# Patient Record
Sex: FEMALE | Race: WHITE
[De-identification: names, ages, dates, MRNs, and addresses within clinical notes are randomized per-mention and may not be internally consistent; named-entity substitution may affect disease eponyms.]

---

## 2018-05-15 ENCOUNTER — HOSPITAL ENCOUNTER (OUTPATIENT)
Dept: HOSPITAL 47 - RADUSWWP | Age: 63
Discharge: HOME | End: 2018-05-15
Payer: COMMERCIAL

## 2018-05-15 DIAGNOSIS — I65.23: ICD-10-CM

## 2018-05-15 DIAGNOSIS — K76.89: Primary | ICD-10-CM

## 2018-05-15 PROCEDURE — 93880 EXTRACRANIAL BILAT STUDY: CPT

## 2018-05-15 PROCEDURE — 76705 ECHO EXAM OF ABDOMEN: CPT

## 2018-05-15 NOTE — US
EXAMINATION TYPE: US carotid duplex BILAT

 

DATE OF EXAM: 5/15/2018

 

COMPARISON: NONE

 

CLINICAL HISTORY: I25.10 atherosclerotic heart disease of native.

 

EXAM MEASUREMENTS: 

 

RIGHT:  Peak Systolic Velocity (PSV) cm/sec

----- Right CCA:  62.3  

----- Right ICA:  87.7     

----- Right ECA:  76.7   

ICA/CCA ratio:  1.4    

 

RIGHT:  End Diastole cm/sec

----- Right CCA:  22.7   

----- Right ICA:  35.9      

----- Right ECA:  18.3     

 

LEFT:  Peak Systolic Velocity (PSV) cm/sec

----- Left CCA:  72.2  

----- Left ICA:  61.6   

----- Left ECA:  104.3  

ICA/CCA ratio:  0.9  

 

LEFT:  End Diastole cm/sec

----- Left CCA:  28.0  

----- Left ICA:  27.6   

----- Left ECA:  26.0 

 

VERTEBRALS (direction of flow):

Right Vertebral: Antegrade

Left Vertebral: Antegrade

 

Rhythm:  Normal

 

Mild intimal wall changes noted at bilateral carotid bifurcation and PSV is wnl bilaterally.

 

Incidental finding: bilateral thyroid nodules are seen. On the left there is a cystic appearing nodul
e measuring 1.4 x 1.8 x 1.1 cm and on the right there is a hypoechoic nodule measuring 0.7 x 0.4 x 0.
6 cm.

 

IMPRESSION: 

1. Mild grayscale atheromatous plaquing without hemodynamically significant stenosis of either caroti
d or serial system.

2. Incidental note of bilateral thyroid nodules. Full evaluation with thyroid ultrasound could be per
formed.

## 2018-05-15 NOTE — US
EXAMINATION TYPE: US abdomen limited

 

DATE OF EXAM: 5/15/2018

 

COMPARISON: NONE

 

CLINICAL HISTORY: I25.10 atherosclerotic heart disease of native.

 

EXAM MEASUREMENTS:

 

Liver Length:  17.3 cm   

Gallbladder Wall:  0.2 cm   

CBD:  0.4 cm

Right Kidney:  9.9 x 4.7 x 4.2 cm

 

 

 

Pancreas:  hyperechoic

Liver:  hyperechoic suggests fatty liver . This limits evaluation for underlying hepatic masses. 

Gallbladder:  wnl

**Evidence for sonographic Landis's sign:  No

CBD:  wnl 

Right Kidney:  No hydronephrosis or masses seen 

 

 

 

IMPRESSION: 

1. Hyperechoic hepatic echotexture most commonly relating to underlying hepatic steatosis. This appea
rs moderate in degree.

2. No sonographic evidence of cholelithiasis or acute cholecystitis.

## 2018-05-25 ENCOUNTER — HOSPITAL ENCOUNTER (OUTPATIENT)
Dept: HOSPITAL 47 - RADUSWWP | Age: 63
Discharge: HOME | End: 2018-05-25
Payer: COMMERCIAL

## 2018-05-25 DIAGNOSIS — E04.2: Primary | ICD-10-CM

## 2018-05-25 PROCEDURE — 76536 US EXAM OF HEAD AND NECK: CPT

## 2018-05-25 NOTE — US
EXAMINATION TYPE: US thyroid st tissue head/neck

 

DATE OF EXAM: 5/25/2018

 

COMPARISON: NONE

 

CLINICAL HISTORY: E04.1 SINGLE THYROID NODULE. Nodules visualized on carotid ultrasound 

 

GLAND SIZE:

 

Right Lobe: 4.1 x 1.0 x 1.4 cm

** Overall Parenchyma:  homogenous

Left Lobe: 4.5 x 1.1 x 1.5 cm

** Overall Parenchyma:  homogeneous

Isthmus Thickness:  0.3 cm

 

NODULES

 

RIGHT:   # of nodules measured on right:  3  

1.  0.7 X 0.4  x 0.6 cm isoechoic solid nodule at the lower pole with well-defined margins; .  This n
odule is wider than tall and shows intranodular vascularity.

** Prior size: No previous 

2. 0.4 X 0.3  x 0.4 cm hypoechoic mixed cystic nodule at the mid pole with well-defined margins; .  T
his nodule is wider than tall and shows intranodular vascularity.

 ** Prior size: No previous

3. 0.4 X 0.2  x 0.4 cm hypoechoic solid nodule at the lower pole with well-defined margins; .  This n
odule is wider than tall and shows intranodular vascularity.

** Prior size: No previous  

 

LEFT:    # of nodules measured on left:  2

1.  1.1 X 1.1  x 1.2 cm hypoechoic cystic nodule at the mid pole with well-defined margins; .  This n
odule is taller than wide and shows no intranodular vascularity.

** Prior size: No previous  

2. 0.5 X 0.3  x 0.5 cm hypoechoic solid nodule at the upper pole with well-defined margins; .  This n
odule is wider than tall and shows no intranodular vascularity.

 ** Prior size:  No previous 

 

ISTHMUS:    # of nodules measured in the isthmus:  0

 

 

Bilateral thyroid nodules. Bilateral neck scanned, no evidence of lymphadenopathy.

 

 

 

 

 

IMPRESSION:

No dominant thyroid mass. Multiple bilateral nodules. Largest abnormality seen is purely cystic nodul
e in the left thyroid lobe.

## 2018-08-13 ENCOUNTER — HOSPITAL ENCOUNTER (OUTPATIENT)
Dept: HOSPITAL 47 - RADUSWWP | Age: 63
Discharge: HOME | End: 2018-08-13
Attending: FAMILY MEDICINE
Payer: COMMERCIAL

## 2018-08-13 DIAGNOSIS — E04.1: Primary | ICD-10-CM

## 2018-08-13 PROCEDURE — 76536 US EXAM OF HEAD AND NECK: CPT

## 2018-08-13 NOTE — US
EXAMINATION TYPE: US thyroid st tissue head/neck

 

DATE OF EXAM: 8/13/2018

 

COMPARISON: US 5/25/2018

 

CLINICAL HISTORY: E04.1 thyroid nodule.

 

GLAND SIZE:

 

Right Lobe: 3.9 x 1.2 x 1.2 cm

** Overall Parenchyma:  homogenous

Left Lobe: 4.6 x 1.4 x 1.4 cm

** Overall Parenchyma:  homogeneous

Isthmus Thickness:  0.3 cm

 

NODULES

 

RIGHT:   # of nodules measured on right: 3

1.  0.7  X 0.4  x 0.6 cm isoechoic solid nodule at the mid pole with well-defined margins; .  This no
dule is wider than tall and shows intranodular vascularity.

** Prior size: 0.7 x 0.4  x 0.6 cm 

2. 0.4 X 0.3  x 0.4 cm hypoechoic mixed nodule at the mid pole with well-defined margins; .  This nod
ule is wider than tall and shows no intranodular vascularity.

 ** Prior size:  0.4 x 0.3  x 0.4 cm 

3. 0.4 X 0.3  x 0.4 cm hypoechoic solid nodule at the lower pole with well-defined margins; .  This n
odule is wider than tall and shows intranodular vascularity.

** Prior size:  0.4 x 0.2  x 0.4 cm 

 

LEFT:    # of nodules measured on left:  2

1.  1.5 X 0.9  x 1.0 cm hypoechoic cystic nodule at the mid pole with well-defined margins; .  This n
odule is wider than tall and shows no intranodular vascularity.

** Prior size: 1.1 x 1.1  x 1.2 cm 

2. 0.5 X 0.3  x 0.5 cm hypoechoic solid nodule at the upper pole with well-defined margins; .  This n
odule is wider than tall and shows no intranodular vascularity.

 ** Prior size:  0.5 x 0.3  x 0.5 cm 

 

 

ISTHMUS:    # of nodules measured in the isthmus:  0

 

 

Bilateral neck scanned, no evidence of lymphadenopathy.

 

 

 

 

 

IMPRESSION:

Multiple findings as above. No dominant thyroid mass. This is consistent with benign disease. No adve
rse change compared to old exam.

## 2018-10-30 ENCOUNTER — HOSPITAL ENCOUNTER (OUTPATIENT)
Dept: HOSPITAL 47 - RADCTMAIN | Age: 63
End: 2018-10-30
Attending: UROLOGY
Payer: COMMERCIAL

## 2018-10-30 DIAGNOSIS — R93.49: Primary | ICD-10-CM

## 2018-10-30 PROCEDURE — 74176 CT ABD & PELVIS W/O CONTRAST: CPT

## 2018-10-30 NOTE — CT
EXAMINATION TYPE: CT abdomen pelvis wo con

 

DATE OF EXAM: 10/30/2018

 

COMPARISON: None

 

HISTORY: Right hydronephrosis on US.

 

CT DLP: 359.3 mGycm

 

Examination of the solid and hollow viscera is limited given the lack of contrast.

 

FINDINGS: 

 

LUNG BASES: No evidence for nodule. No evidence for infiltrate.

 

LIVER/GB: The gallbladder is unremarkable. No space-occupying hepatic lesion.

 

PANCREAS: No pancreatic mass identified. No inflammatory process seen.

 

SPLEEN: No evidence for splenomegaly. No intrasplenic lesions seen.

 

ADRENALS: No adrenal nodules identified. No evidence for thickening.

 

KIDNEYS: Right-sided extrarenal pelvis. No evidence for renal mass. No nephrolithiasis. No hydronephr
osis.

 

BOWEL: Appendix has a normal appearance. No evidence of bowel obstruction. No inflammatory process.

 

Lymph nodes: No evidence for adenopathy greater than 1 cm.

 

Abdominal aorta: Atheromatous changes seen. No evidence for aneurysm.

 

Genital organs: No significant abnormality.

 

Other: No significant abnormality.

 

IMPRESSION: 

1. Right-sided extrarenal pelvis without evidence for hydronephrosis.

## 2018-12-05 ENCOUNTER — HOSPITAL ENCOUNTER (OUTPATIENT)
Dept: HOSPITAL 47 - RADECHMAIN | Age: 63
Discharge: HOME | End: 2018-12-05
Attending: INTERNAL MEDICINE
Payer: COMMERCIAL

## 2018-12-05 DIAGNOSIS — I08.8: ICD-10-CM

## 2018-12-05 DIAGNOSIS — I08.3: Primary | ICD-10-CM

## 2018-12-05 PROCEDURE — 93306 TTE W/DOPPLER COMPLETE: CPT

## 2018-12-05 PROCEDURE — 93351 STRESS TTE COMPLETE: CPT

## 2018-12-05 NOTE — ECHOF
Referral Reason:R07.9 Chest pain



MEASUREMENTS

--------

HEIGHT: 139.7 cm

WEIGHT: 29.5 kg

BP: 

RVIDd:   2.5 cm     (< 3.3)

IVSd:   1.0 cm     (0.6 - 1.1)

LVIDd:   4.2 cm     (3.9 - 5.3)

LVPWd:   1.4 cm     (0.6 - 1.1)

IVSs:   1.5 cm

LVIDs:   2.4 cm

LVPWs:   1.8 cm

LA Diam:   4.4 cm     (2.7 - 3.8)

LAESV Index (A-L):   35.67 ml/m

Ao Diam:   2.8 cm     (2.0 - 3.7)

AV Cusp:   2.2 cm     (1.5 - 2.6)

MV EXCURSION:   18.221 mm     (> 18.000)

MV EF SLOPE:   47 mm/s     (70 - 150)

EPSS:   0.2 cm

MV E Sid:   0.85 m/s

MV DecT:   177 ms

MV A Sid:   0.89 m/s

MV E/A Ratio:   0.95 

AR PHT:   468 ms

RAP:   5.00 mmHg

RVSP:   23.58 mmHg







FINDINGS

--------

Sinus rhythm.

This was a technically good study.

The left ventricular size is normal.   Left ventricular wall thickness is normal.   Overall left vent
ricular systolic function is normal with, an EF between 55 - 60 %.

The right ventricle is normal in size.

LA is moderately dilated 34-39 ml/m2

The right atrial size is normal.

There is mild aortic valve sclerosis.   There is mild aortic regurgitation.

The mitral valve leaflets are mildly thickened.   Moderate mitral regurgitation is present.

Mild tricuspid regurgitation present.   There is no evidence of pulmonary hypertension.   The right v
entricular systolic pressure, as measured by Doppler, is 23.58mmHg.

Trace/mild (physiologic)  pulmonic regurgitation.

The aortic root size is normal.

There is no pericardial effusion.



CONCLUSIONS

--------

1. Sinus rhythm.

2. This was a technically good study.

3. The left ventricular size is normal.

4. Left ventricular wall thickness is normal.

5. Overall left ventricular systolic function is normal with, an EF between 55 - 60 %.

6. LA is moderately dilated 34-39 ml/m2

7. There is mild aortic valve sclerosis.

8. There is mild aortic regurgitation.

9. The mitral valve leaflets are mildly thickened.

10. Moderate mitral regurgitation is present.

11. Mild tricuspid regurgitation present.

12. There is no evidence of pulmonary hypertension.

13. Trace/mild (physiologic)  pulmonic regurgitation.

14. The aortic root size is normal.

15. There is no pericardial effusion.





SONOGRAPHER: Nathalie Hudson RDCS

## 2019-03-19 ENCOUNTER — HOSPITAL ENCOUNTER (OUTPATIENT)
Dept: HOSPITAL 47 - RADUSMAIN | Age: 64
Discharge: HOME | End: 2019-03-19
Attending: INTERNAL MEDICINE
Payer: COMMERCIAL

## 2019-03-19 DIAGNOSIS — I70.1: Primary | ICD-10-CM

## 2019-03-19 PROCEDURE — 93975 VASCULAR STUDY: CPT

## 2019-03-19 NOTE — US
EXAMINATION TYPE: US renal artery duplex complet

 

DATE OF EXAM: 3/19/2019

 

COMPARISON: NONE

 

CLINICAL HISTORY: Renal Artery Stenosis I70.1.

 

MEASUREMENTS:

 

RENAL SIZE:

Rt Kidney: 10.1 x 4.0 x 4.5cm

Lt Kidney: 8.8 x 4.7 x 4.4cm

 

RESISTANCE INDEX

Right:  0.69

Left: 0.67

 

RA/AO RATIO (< 3.5 )

Right: 1.5

Left: 1.6

 

RA VELOCITY ( < 180 cm/s)

Right: 109cm/s

Left: 120cm/s

 

Probable extra renal pelvis right.

 

Some technical difficulty due to overlying bowel gas. No evidence of renal artery stenosis. Left kidn
ey measures slightly atrophic.

 

 

IMPRESSION:

No sonographic evidence of renal arterial stenosis.

## 2019-06-20 ENCOUNTER — HOSPITAL ENCOUNTER (OUTPATIENT)
Dept: HOSPITAL 47 - RADUSWWP | Age: 64
Discharge: HOME | End: 2019-06-20
Attending: FAMILY MEDICINE
Payer: COMMERCIAL

## 2019-06-20 DIAGNOSIS — E04.2: Primary | ICD-10-CM

## 2019-06-20 PROCEDURE — 76536 US EXAM OF HEAD AND NECK: CPT

## 2019-06-21 NOTE — US
EXAMINATION TYPE: US thyroid st tissue head/neck

 

DATE OF EXAM: 6/20/2019

 

COMPARISON: 5/25/2018

 

CLINICAL HISTORY: E04.2 goiter. Multinodular goiter.

 

GLAND SIZE:

 

Right Lobe: 4.2 x 1.7 x 1.5 cm

** Overall Parenchyma:  homogenous

Left Lobe: 4.4 x 1.5 x 1.5 cm

** Overall Parenchyma:  homogeneous

Isthmus Thickness:  0.3 cm

 

NODULES

 

RIGHT:   # of nodules measured on right: 3

1.  .9 X .5  x .8 cm isoechoic solid nodule at the mid pole with well-defined margins.  This nodule i
s wider than tall and shows intranodular vascularity.

** Prior size: .7 x .4  x .6 cm 

2. .4 X .3  x .3 cm hypoechoic mixed nodule at the mid pole with well-defined margins.  This nodule i
s wider than tall and shows intranodular vascularity.

 ** Prior size:  .4 x .3  x .4 cm 

3. .7 X .5  x .6 cm hypoechoic mixed nodule at the lower pole with well-defined margins.  This nodule
 is wider than tall and shows intranodular vascularity.

** Prior size:  .4 x .3  x .4 cm 

 

LEFT:    # of nodules measured on left:  3

1.  .7 X .7  x .6 cm hypoechoic mixed nodule at the mid pole with well-defined margins.  This nodule 
is wider than tall and shows no intranodular vascularity.

** Prior size: 1.5 x .9  x 1.0 cm 

2. .7 X .7  x .6 cm hypoechoic solid nodule at the lower pole with well-defined margins.  This nodule
 is wider than tall and shows no intranodular vascularity.

 ** Prior size:  .5 x .3  x .5 cm 

3. .5 X .3  x .5 cm hypoechoic cystic nodule at the upper pole with well-defined margins.  This nodul
e is wider than tall and shows no intranodular vascularity.

 

ISTHMUS:    # of nodules measured in the isthmus:  0

 

Bilateral neck scanned. 

Hypoechoic area with the appearance of a lymph node right neck measuring 1.3 x 0.4 x 0.8, nonenlarged
.

 

 

 

IMPRESSION:

Overall similar size of the multiple bilateral subcentimeter thyroid nodules, few of which demonstrat
ing only minimal growth in comparison to the exam of 5/25/2018.

## 2020-01-07 ENCOUNTER — HOSPITAL ENCOUNTER (OUTPATIENT)
Dept: HOSPITAL 47 - RADECHMAIN | Age: 65
Discharge: HOME | End: 2020-01-07
Attending: INTERNAL MEDICINE
Payer: COMMERCIAL

## 2020-01-07 ENCOUNTER — HOSPITAL ENCOUNTER (OUTPATIENT)
Dept: HOSPITAL 47 - RADUSWWP | Age: 65
Discharge: HOME | End: 2020-01-07
Attending: FAMILY MEDICINE
Payer: COMMERCIAL

## 2020-01-07 DIAGNOSIS — E04.2: Primary | ICD-10-CM

## 2020-01-07 DIAGNOSIS — I08.3: Primary | ICD-10-CM

## 2020-01-07 DIAGNOSIS — I31.3: ICD-10-CM

## 2020-01-07 PROCEDURE — 76536 US EXAM OF HEAD AND NECK: CPT

## 2020-01-07 PROCEDURE — 93306 TTE W/DOPPLER COMPLETE: CPT

## 2020-01-07 NOTE — ECHOF
Referral Reason:I34.2 Nonrheumatic mitral (valve) stenosis



MEASUREMENTS

--------

HEIGHT: 165.1 cm

WEIGHT: 72.6 kg

BP: 

RVIDd:   3.1 cm     (< 3.3)

IVSd:   1.3 cm     (0.6 - 1.1)

LVIDd:   3.7 cm     (3.9 - 5.3)

LVPWd:   1.6 cm     (0.6 - 1.1)

IVSs:   1.7 cm

LVIDs:   2.5 cm

LVPWs:   1.8 cm

LA Diam:   4.1 cm     (2.7 - 3.8)

LAESV Index (A-L):   28.24 ml/m

Ao Diam:   2.9 cm     (2.0 - 3.7)

AV Cusp:   2.0 cm     (1.5 - 2.6)

LA Diam:   3.9 cm     (2.7 - 3.8)

MV EXCURSION:   16.659 mm     (> 18.000)

MV EF SLOPE:   64 mm/s     (70 - 150)

EPSS:   0.2 cm

MV E Sid:   0.69 m/s

MV DecT:   225 ms

MV A Sid:   0.77 m/s

MV E/A Ratio:   0.90 

AR PHT:   498 ms

RAP:   5.00 mmHg

RVSP:   11.51 mmHg

TAPSE:   21.52 mm







FINDINGS

--------

Sinus rhythm.

This was a technically good study.

The left ventricular size is normal.   There is mild concentric left ventricular hypertrophy.   Overa
ll left ventricular systolic function is normal with, an EF between 55 - 60 %.   The diastolic fillin
g pattern is normal for the age of the patient 11.99.

The right ventricle is normal in size.

The left atrium is mildly dilated.   LA is midly dilated 29-33ml/m2.

There is mild to moderate aortic valve sclerosis.   There is mild aortic regurgitation.

The mitral valve leaflets are mild to  moderately thickened.   Mild mitral annular calcification pres
ent.   Mild-to-moderate mitral regurgitation is present.   Mild prolapse of the posterior mitral valv
e leaflet.

Mild tricuspid regurgitation present.   Right ventricular systolic pressure is normal at < 35 mmHg.  
 There is no evidence of pulmonary hypertension.

There is a trivial pericardial effusion present.



CONCLUSIONS

--------

1. Sinus rhythm.

2. This was a technically good study.

3. The left ventricular size is normal.

4. There is mild concentric left ventricular hypertrophy.

5. Overall left ventricular systolic function is normal with, an EF between 55 - 60 %.

6. The diastolic filling pattern is normal for the age of the patient 11.99

7. The right ventricle is normal in size.

8. The left atrium is mildly dilated.

9. LA is midly dilated 29-33ml/m2.

10. There is mild to moderate aortic valve sclerosis.

11. There is mild aortic regurgitation.

12. The mitral valve leaflets are mild to  moderately thickened.

13. Mild mitral annular calcification present.

14. Mild-to-moderate mitral regurgitation is present.

15. Mild prolapse of the posterior mitral valve leaflet.

16. Mild tricuspid regurgitation present.

17. Right ventricular systolic pressure is normal at < 35 mmHg.

18. There is no evidence of pulmonary hypertension.

19. There is a trivial pericardial effusion present.





SONOGRAPHER: Nathalie Hudson RDCS

## 2020-01-07 NOTE — US
EXAMINATION TYPE: US thyroid st tissue head/neck

 

DATE OF EXAM: 1/7/2020

 

COMPARISON: US 6/20/2019, US 8/13/2018

 

CLINICAL HISTORY: E04.1 nontoxic single thyroid nodule.

 

GLAND SIZE:

 

Right Lobe: 4.0 x 1.2 x 1.4 cm

** Overall Parenchyma:  homogenous

Left Lobe: 4.9 x 1.4 x 1.2 cm

** Overall Parenchyma:  homogeneous

Isthmus Thickness:  0.3 cm

 

NODULES- Multiple subcentimeter nodules visualized on the right, largest 3:

 

RIGHT:   # of nodules measured on right: 3

1.  0.9 X 0.5  x 0.7 cm hypoechoic mixed nodule at the mid pole with well-defined margins; .  This no
dule is wider than tall and shows intranodular vascularity.

** Prior size: 0.9 X 0.5 x 0.8 cm

2. 0.5 X 0.4  x 0.6 cm hypoechoic mixed nodule at the mid pole with well-defined margins; .  This nod
ule is wider than tall and shows no intranodular vascularity.

 ** Prior size:  0.6 x 0.3  x 0.5 cm 

3. 0.5 X 0.3  x 0.6 cm hypoechoic solid nodule at the lower pole with well-defined margins; .  This n
odule is wider than tall and shows intranodular vascularity.

** Prior size:  0.4 x 0.3  x 0.4 cm 

 

 

LEFT:    # of nodules measured on left:  2

1.  0.6 X 0.4  x 0.6 cm hypoechoic cystic nodule at the upper pole with well-defined margins; .  This
 nodule is wider than tall and shows no intranodular vascularity.

** Prior size: 0.5 x 0.3  x 0.5 cm 

2. 0.5 X 0.6  x 0.4 cm echogenic solid nodule at the lower pole with well-defined margins; .  This no
dule is wider than tall and shows no intranodular vascularity.

 ** Prior size: Not previously visualized 

 

ISTHMUS:    # of nodules measured in the isthmus:  0

 

 

Bilateral neck scanned, no evidence of lymphadenopathy.

 

 

IMPRESSION:

Similar size of the multiple bilateral subcentimeter thyroid nodules in a multinodular goiter. No gre
ater than 1 cm nodule.

## 2021-01-23 ENCOUNTER — HOSPITAL ENCOUNTER (EMERGENCY)
Dept: HOSPITAL 47 - EC | Age: 66
Discharge: HOME | End: 2021-01-23
Payer: COMMERCIAL

## 2021-01-23 VITALS
TEMPERATURE: 98.2 F | HEART RATE: 57 BPM | SYSTOLIC BLOOD PRESSURE: 133 MMHG | RESPIRATION RATE: 19 BRPM | DIASTOLIC BLOOD PRESSURE: 72 MMHG

## 2021-01-23 DIAGNOSIS — Z79.02: ICD-10-CM

## 2021-01-23 DIAGNOSIS — S50.11XA: ICD-10-CM

## 2021-01-23 DIAGNOSIS — Z98.51: ICD-10-CM
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
## 2021-01-23 DIAGNOSIS — S00.03XA: Primary | ICD-10-CM
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
## 2021-01-23 DIAGNOSIS — Z91.041: ICD-10-CM

## 2021-01-23 DIAGNOSIS — Z86.73: ICD-10-CM

## 2021-01-23 PROCEDURE — 70450 CT HEAD/BRAIN W/O DYE: CPT

## 2021-01-23 PROCEDURE — 72125 CT NECK SPINE W/O DYE: CPT

## 2021-01-23 PROCEDURE — 99284 EMERGENCY DEPT VISIT MOD MDM: CPT

## 2021-01-23 NOTE — ED
Fall HPI





- General


Chief Complaint: Fall


Stated Complaint: fall/arm injury/head injury


Time Seen by Provider: 01/23/21 12:03


Source: patient


Mode of arrival: ambulatory





- History of Present Illness


Initial Comments: 





Patient is a 66-year-old female presenting to the emergency department after she

fell at home today.  Patient states she was trying to carry two [place] samples 

and slipped and fell onto her right side.  Patient has a deformity of her right 

wrist and she is also complaining that something hit the right side of her head.

 She does have a small bump there.  She denies any loss of consciousness.  She 

denies having a headache, no dizziness or blurry vision.  She is on Plavix with 

history of stroke.  She denies any other injuries from this fall.  She is no 

further complaints.





- Related Data


                                    Allergies











Allergy/AdvReac Type Severity Reaction Status Date / Time


 


Iodinated Contrast Media Allergy  Anaphylaxis Verified 01/23/21 11:55


 


iodine Allergy  Anaphylaxis Verified 01/23/21 11:55














Review of Systems


ROS Statement: 


Those systems with pertinent positive or pertinent negative responses have been 

documented in the HPI.





ROS Other: All systems not noted in ROS Statement are negative.





Past Medical History


Past Medical History: CVA/TIA, Hyperlipidemia, Hypertension


History of Any Multi-Drug Resistant Organisms: None Reported


Past Surgical History: Tubal Ligation


Past Psychological History: Anxiety


Smoking Status: Never smoker


Past Alcohol Use History: Daily


Past Drug Use History: None Reported





General Exam





- General Exam Comments


Initial Comments: 





GENERAL: 


Patient is well-developed and well-nourished.  Patient is nontoxic and in no 

acute distress.





HEAD: 


Atraumatic, normocephalic.  Patient has a very small hematoma to the right 

temporal area, no signs of basal skull fracture.





EYES:


Pupils equal round and reactive to light, extraocular movements intact, sclera 

anicteric, conjunctiva are normal.  Eyelids were unremarkable.





ENT: 


TMs normal, nares patent, oropharynx clear without exudates.  Moist mucous 

membranes.





NECK: 


Normal range of motion, supple without lymphadenopathy or JVD.  No midline 

tenderness.





LUNGS:


Unlabored respirations.  Breath sounds clear to auscultation bilaterally and 

equal.  No wheezes rales or rhonchi.





HEART:


Regular rate and rhythm without murmurs, rubs or gallops.





ABDOMEN: 


Soft, nontender, normoactive bowel sounds.  No guarding, no rebound.  No masses 

appreciated.





: Deferred 





MUSCULOSKELETAL: 


Patient has a deformity of the right wrist, radial pulses intact and normal.  

She is able to wiggle all of her fingers, sensation is intact.  Rest of 

extremities  with adequate strength and normal range of motion, no pitting or 

edema.  No clubbing or cyanosis.





NEUROLOGICAL: 


Patient is alert and oriented x 3.  Motor and sensory are also intact.  Cranial 

nerves II through XII grossly intact.  Symmetrical smile.  Normal speech, normal

gait.   





PSYCH:


Normal mood, normal affect.





SKIN:


 Warm, Dry, normal turgor, no rashes.  Patient has a small abrasion over the 

right forearm, no active bleeding at this time.


Limitations: no limitations





Course


                                   Vital Signs











  01/23/21





  11:49


 


Temperature 98.2 F


 


Pulse Rate 57 L


 


Respiratory 19





Rate 


 


Blood Pressure 133/72


 


O2 Sat by Pulse 99





Oximetry 














Medical Decision Making





- Medical Decision Making





Patient is a 66-year-old female here after she fell, she has no deformity of her

right wrist, pulses are equal and intact, she is able to wiggle her fingers.  CT

of brain and C-spine revealed no acute injuries.  X-rays of the right forearm 

and wrist revealed no acute fracture or dislocations.  Patient has a large 

hematoma on her right forearm with a small abrasion.  We did cut off 2 rings of 

her right hand.  Her pulses remained equal.  Abrasion was cleaned, bandage 

applied and Ace wrap was complied over the hematoma the right forearm.  Patient 

is stable for discharge.  I recommended continuing with the compression, ice to 

the area and Tylenol Motrin for any discomfort.  She can follow-up with her 

regular doctor.  She is in agreement with this plan of care.  Return parameters 

were discussed with the patient and she verbalized understanding.  Case 

discussed with Dr. Miles. 





Disposition


Clinical Impression: 


 Fall, Traumatic hematoma of right forearm, Scalp hematoma





Disposition: HOME SELF-CARE


Condition: Stable


Instructions (If sedation given, give patient instructions):  Hematoma (ED)


Additional Instructions: 


Please return to the Emergency Department if symptoms worsen or any other 

concerns.


Please keep compression on the hematoma.  May use ice to the area.


May take ibuprofen or Tylenol for any discomfort.  


Please follow-up with your regular doctor.


Is patient prescribed a controlled substance at d/c from ED?: No


Referrals: 


Ángel Garcia MD [Primary Care Provider] - 1-2 days

## 2021-01-23 NOTE — CT
EXAMINATION TYPE: CT brain rosa gonzalez con

 

DATE OF EXAM: 1/23/2021

 

COMPARISON: 5/31/2010

 

HISTORY: Fall and on PLAVIX

 

CT DLP: 1267 mGycm, Automated exposure control for dose reduction was used.

 

CONTRAST: Patient injected with 0 mL of Isovue 300.

 

CT of the brain is performed utilizing 3 mm thick sections through the posterior fossa and 3 mm thick
 sections through the remaining calvarium.  

 

Study is performed within 24 hours of arrival to the hospital.

 

 No abnormal hyperdensity is present to suggest an acute intracranial hemorrhage.

No mass lesion is evident.

No acute infarcts are evident.

Ventricles and sulci are appropriate for the patient age.  

 

Paranasal sinuses and mastoid air cells within the field-of-view are clear. 

 

IMPRESSIONS:

1. Normal CT brain. No acute intracranial process radiographically evident.

 

CT cervical spine.

 

COMPARISON: None

 

CT of the cervical spine is performed in the axial plane at 2 mm thick sections.  Reconstructed image
s in the coronal, and sagittal plane are reviewed on the computer. 

 

No acute fractures are evident.

Vertebral body alignment is normal.

Generative disc changes are present C6-7. Posterior endplate spurring is present at this level. Findi
ngs are new interval development from 2010

Vertebral body heights are preserved.

There is right foraminal narrowing due to uncovertebral joint hypertrophy at C4-5. Uncovertebral join
t hypertrophy has moderate bilateral foraminal stenosis at C6-7.

No neural foraminal stenosis is evident. 

 

IMPRESSIONS:

1. Degenerative disc changes C6-7 with endplate spurring.

2. Uncovertebral joint hypertrophy moderately bilaterally at C6-7. Some right foraminal stenosis also
 noted C4-5

## 2021-01-23 NOTE — XR
EXAMINATION TYPE: XR forearm RT

 

DATE OF EXAM: 1/23/2021

 

COMPARISON: None

 

HISTORY: Fall, deformity

 

TECHNIQUE: 2 view right forearm

 

FINDINGS: No acute fracture or dislocation is evident. Very prominent soft tissue swelling is over th
e distal forearm dorsally.

 

IMPRESSION:

1.  Prominent soft tissue swelling distal half dorsal forearm.

2. Underlying osseous structures appear intact. Follow-up exams can be performed 7-10 days from acute
 trauma for continued pain.

## 2021-01-23 NOTE — XR
EXAMINATION TYPE: XR wrist complete RT

 

DATE OF EXAM: 1/23/2021

 

COMPARISON: None

 

HISTORY: Fall, deformity

 

TECHNIQUE: 4 view right wrist

 

FINDINGS: No acute osseous abnormality is evident. If there is pain at the anatomic snuff box, nuclea
r medicine bone scan could be performed for additional evaluation. Follow-up exams can be performed 7
-10 days from acute trauma for continued pain.

 

There is very prominent soft tissue swelling over the dorsum of the distal forearm.

 

IMPRESSION:

1.  Prominent soft tissue swelling distal dorsal forearm.

2. No acute osseous abnormality.

## 2023-08-08 ENCOUNTER — HOSPITAL ENCOUNTER (EMERGENCY)
Dept: HOSPITAL 47 - EC | Age: 68
Discharge: HOME | End: 2023-08-08
Payer: MEDICARE

## 2023-08-08 VITALS
RESPIRATION RATE: 17 BRPM | SYSTOLIC BLOOD PRESSURE: 113 MMHG | DIASTOLIC BLOOD PRESSURE: 73 MMHG | HEART RATE: 63 BPM | TEMPERATURE: 97.7 F

## 2023-08-08 DIAGNOSIS — K59.00: Primary | ICD-10-CM

## 2023-08-08 DIAGNOSIS — F41.9: ICD-10-CM

## 2023-08-08 DIAGNOSIS — Z79.82: ICD-10-CM

## 2023-08-08 DIAGNOSIS — I10: ICD-10-CM

## 2023-08-08 DIAGNOSIS — Z79.899: ICD-10-CM

## 2023-08-08 DIAGNOSIS — Z88.8: ICD-10-CM

## 2023-08-08 DIAGNOSIS — E78.5: ICD-10-CM

## 2023-08-08 DIAGNOSIS — Z79.02: ICD-10-CM

## 2023-08-08 DIAGNOSIS — Z88.5: ICD-10-CM

## 2023-08-08 LAB
ALBUMIN SERPL-MCNC: 4.4 G/DL (ref 3.5–5)
ALP SERPL-CCNC: 81 U/L (ref 38–126)
ALT SERPL-CCNC: 25 U/L (ref 4–34)
ANION GAP SERPL CALC-SCNC: 10 MMOL/L
AST SERPL-CCNC: 27 U/L (ref 14–36)
BASOPHILS # BLD AUTO: 0 K/UL (ref 0–0.2)
BASOPHILS NFR BLD AUTO: 0 %
BUN SERPL-SCNC: 16 MG/DL (ref 7–17)
CALCIUM SPEC-MCNC: 9.5 MG/DL (ref 8.4–10.2)
CHLORIDE SERPL-SCNC: 106 MMOL/L (ref 98–107)
CO2 SERPL-SCNC: 24 MMOL/L (ref 22–30)
EOSINOPHIL # BLD AUTO: 0.3 K/UL (ref 0–0.7)
EOSINOPHIL NFR BLD AUTO: 3 %
ERYTHROCYTE [DISTWIDTH] IN BLOOD BY AUTOMATED COUNT: 4.35 M/UL (ref 3.8–5.4)
ERYTHROCYTE [DISTWIDTH] IN BLOOD: 13 % (ref 11.5–15.5)
GLUCOSE SERPL-MCNC: 93 MG/DL (ref 74–99)
HCT VFR BLD AUTO: 40.5 % (ref 34–46)
HGB BLD-MCNC: 14 GM/DL (ref 11.4–16)
LIPASE SERPL-CCNC: 77 U/L (ref 23–300)
LYMPHOCYTES # SPEC AUTO: 2.4 K/UL (ref 1–4.8)
LYMPHOCYTES NFR SPEC AUTO: 30 %
MAGNESIUM SPEC-SCNC: 1.9 MG/DL (ref 1.6–2.3)
MCH RBC QN AUTO: 32.1 PG (ref 25–35)
MCHC RBC AUTO-ENTMCNC: 34.5 G/DL (ref 31–37)
MCV RBC AUTO: 93 FL (ref 80–100)
MONOCYTES # BLD AUTO: 0.4 K/UL (ref 0–1)
MONOCYTES NFR BLD AUTO: 5 %
NEUTROPHILS # BLD AUTO: 4.9 K/UL (ref 1.3–7.7)
NEUTROPHILS NFR BLD AUTO: 61 %
PLATELET # BLD AUTO: 172 K/UL (ref 150–450)
POTASSIUM SERPL-SCNC: 4 MMOL/L (ref 3.5–5.1)
PROT SERPL-MCNC: 7.1 G/DL (ref 6.3–8.2)
SODIUM SERPL-SCNC: 140 MMOL/L (ref 137–145)
WBC # BLD AUTO: 8 K/UL (ref 3.8–10.6)

## 2023-08-08 PROCEDURE — 80053 COMPREHEN METABOLIC PANEL: CPT

## 2023-08-08 PROCEDURE — 36415 COLL VENOUS BLD VENIPUNCTURE: CPT

## 2023-08-08 PROCEDURE — 85025 COMPLETE CBC W/AUTO DIFF WBC: CPT

## 2023-08-08 PROCEDURE — 74018 RADEX ABDOMEN 1 VIEW: CPT

## 2023-08-08 PROCEDURE — 83690 ASSAY OF LIPASE: CPT

## 2023-08-08 PROCEDURE — 99284 EMERGENCY DEPT VISIT MOD MDM: CPT

## 2023-08-08 PROCEDURE — 83735 ASSAY OF MAGNESIUM: CPT

## 2023-08-08 NOTE — XR
EXAMINATION TYPE: XR KUB

 

DATE OF EXAM: 8/8/2023 5:27 PM

 

INDICATION: 

Patient age:Female;  68 years old; 

Reason for study: Constipation; 

 

COMPARISON: 10/30/2018 CT

 

TECHNIQUE: One radiographic view of the abdomen was obtained.

 

FINDINGS: The bowel gas pattern is nonspecific without dilated loops of small or large bowel. There i
s no evidence for organomegaly or pneumoperitoneum.  The osseous structures are intact.  No abnormal 
calcifications are present. Fecal material and gas are demonstrated throughout the colon and rectum. 
 

 

IMPRESSION:

Nonspecific bowel gas pattern without radiographic evidence for acute process.

## 2023-08-08 NOTE — ED
General Adult HPI





- General


Chief complaint: Abdominal Pain


Stated complaint: Blockage Inst, Sent by PCP


Time Seen by Provider: 08/08/23 16:01


Source: patient


Mode of arrival: ambulatory


Limitations: no limitations





- History of Present Illness


Initial comments: 


This is a 68-year-old female with a past medical history including hypertension 

presented to the emergency department for possible constipation and an episode 

of nausea and vomiting.  The patient stated that she is on Rybelsus and stated 

that she had an increase in her dose on Friday to 7 mg and noted that on Sunday 

she had an episode of nausea and vomiting.  The patient stated that after the 

episode of nausea and vomiting she had resolution of her symptoms without any 

acute abdominal pain or distress noted.  The patient then contact her primary 

care physician today and did state that she needed to come to the emergency 

department "get a CAT scan."  The patient was resting in bed comfortably without

any acute pain, distress or symptoms.  The patient was resting in bed comfortab

ly.








- Related Data


                                Home Medications











 Medication  Instructions  Recorded  Confirmed


 


Aspirin EC [Ecotrin] 325 mg PO DAILY 08/08/23 08/08/23


 


Atorvastatin [Lipitor] 40 mg PO HS 08/08/23 08/08/23


 


Clopidogrel [Plavix] 75 mg PO DAILY 08/08/23 08/08/23


 


Klonopin (Unverified) 1 mg PO AS DIRECTED 08/08/23 08/08/23


 


Losartan Potassium [Cozaar] 100 mg PO DAILY 08/08/23 08/08/23


 


Semaglutide [Rybelsus] 7 mg PO AS DIRECTED 08/08/23 08/08/23


 


carvediloL [Coreg] 25 mg PO TID 08/08/23 08/08/23


 


hydrALAZINE HCL [Apresoline] 25 mg PO TID 08/08/23 08/08/23











                                    Allergies











Allergy/AdvReac Type Severity Reaction Status Date / Time


 


Iodinated Contrast Media Allergy  Anaphylaxis Verified 08/08/23 17:13


 


iodine Allergy  Anaphylaxis Verified 08/08/23 17:13














Review of Systems


ROS Statement: 


Those systems with pertinent positive or pertinent negative responses have been 

documented in the HPI.





ROS Other: All systems not noted in ROS Statement are negative.





Past Medical History


Past Medical History: CVA/TIA, Hyperlipidemia, Hypertension


History of Any Multi-Drug Resistant Organisms: None Reported


Past Surgical History: Tubal Ligation


Past Psychological History: Anxiety


Smoking Status: Never smoker


Past Alcohol Use History: Daily


Past Drug Use History: None Reported





General Exam


Limitations: no limitations


General appearance: alert, in no apparent distress


Head exam: Present: atraumatic, normocephalic, normal inspection


Eye exam: Present: normal appearance, PERRL


Pupils: Present: normal accommodation


ENT exam: Present: normal exam, normal oropharynx, mucous membranes moist


Neck exam: Present: normal inspection, full ROM


Respiratory exam: Present: normal lung sounds bilaterally


Cardiovascular Exam: Present: regular rate, normal rhythm, normal heart sounds


GI/Abdominal exam: Present: soft, normal bowel sounds.  Absent: distended, 

tenderness


Extremities exam: Present: normal inspection, full ROM


Back exam: Present: normal inspection, full ROM


Neurological exam: Present: alert, oriented X3, CN II-XII intact


Psychiatric exam: Present: normal affect, normal mood


Skin exam: Present: warm, dry





Course


                                   Vital Signs











  08/08/23 08/08/23





  15:28 18:19


 


Temperature 97.4 F L 97.7 F


 


Pulse Rate 75 63


 


Respiratory 18 17





Rate  


 


Blood Pressure 97/66 113/73


 


O2 Sat by Pulse 95 98





Oximetry  














Medical Decision Making





- Medical Decision Making


Was pt. sent in by a medical professional or institution (, PA, NP, urgent 

care, hospital, or nursing home...) When possible be specific


@  -Yes, patient sent in by her primary care physician for a "CAT scan"


Did you speak to anyone other than the patient for history (EMS, parent, family,

 police, friend...)? What history was obtained from this source 


@  -No


Did you review nursing and triage notes (agree or disagree)?  Why? 


@  -I reviewed and agree with nursing and triage notes


Were old charts reviewed (outside hosp., previous admission, EMS record, old 

EKG, old radiological studies, urgent care reports/EKG's, nursing home records)?

 Report findings 


@  -No old charts were reviewed


Differential Diagnosis (chest pain, altered mental status, abdominal pain women,

 abdominal pain men, vaginal bleeding, weakness, fever, dyspnea, syncope, 

headache, dizziness, GI bleed, back pain, seizure, CVA, palpatations, mental 

health)? 


@  -Constipation, small bowel obstruction, gastroenteritis


EKG interpreted by me (3pts min.).


@  -None


X-rays interpreted by me (1pt min.).


@  -KUB x-ray was obtained and was interpreted by myself showing no acute 

process.


CT interpreted by me (1pt min.).


@  -None done


U/S interpreted by me (1pt. min.).


@  -None done


What testing was considered but not performed or refused? (CT, X-rays, U/S, 

labs)? Why?


@  -Computed tomography scan was momentarily considered however the patient had 

no symptoms and had a soft abdomen without any acute tenderness noted therefore 

a computed tomography scan was not needed at this time.


What meds were considered but not given or refused? Why?


@  -None


Did you discuss the management of the patient with other professionals 

(professionals i.e. Dr., PA, NP, lab, RT, psych nurse, , , 

teacher, , )? Give summary


@  -No


Was smoking cessation discussed for >3mins.?


@  -No


Was critical care preformed (if so, how long)?


@  -No


Were there social determinants of health that impacted care today? How? 

(Homelessness, low income, unemployed, alcoholism, drug addiction, 

transportation, low edu. Level, literacy, decrease access to med. care, long term, 

rehab)?


@  -No


Was there de-escalation of care discussed even if they declined (Discuss DNR or 

withdrawal of care, Hospice)? DNR status


@  -No


What co-morbidities impacted this encounter? (DM, HTN, Smoking, COPD, CAD, 

Cancer, CVA, ARF, Chemo, Hep., AIDS, mental health diagnosis, sleep apnea, 

morbid obesity)?


@  -Hypertension


Was patient admitted / discharged? Hospital course, mention meds given and 

route, prescriptions, significant lab abnormalities, going to OR and other 

pertinent info.


@  -The patient was seen and evaluated in emergency department.  Physical exam, 

the patient was resting in bed without any acute distress.  Vital signs 

admission were stable.  All laboratory workup and imaging was negative and the 

patient likely had side effects secondary to the increased dosage of her weight 

loss medication.  The patient was advised to follow-up with her primary care 

physician for further recommendations for dosing.  The patient was also advised 

report back to the emergency department if her pain or symptoms became acutely 

worse.  The patient was agreeable to this and all her questions were answered ap

propriately.  The patient was discharged home in stable condition.


Undiagnosed new problem with uncertain prognosis?


@  -No


Drug Therapy requiring intensive monitoring for toxicity (Heparin, Nitro, 

Insulin, Cardizem)?


@  -No


Were any procedures done?


@  -No


Diagnosis/symptom?


@  -Episode of nausea, vomiting, resolved


Acute, or Chronic, or Acute on Chronic?


@  -Acute


Uncomplicated (without systemic symptoms) or Complicated (systemic symptoms)?


@  -Uncomplicated


Side effects of treatment?


@  -No


Exacerbation, Progression, or Severe Exacerbation?


@  -No


Poses a threat to life or bodily function? How? (Chest pain, USA, MI, pneumonia,

 PE, COPD, DKA, ARF, appy, cholecystitis, CVA, Diverticulitis, Homicidal, 

Suicidal, threat to staff... and all critical care pts)


@  -No








- Lab Data


Result diagrams: 


                                 08/08/23 16:46





                                 08/08/23 16:46


                                   Lab Results











  08/08/23 08/08/23 Range/Units





  16:46 16:46 


 


WBC  8.0   (3.8-10.6)  k/uL


 


RBC  4.35   (3.80-5.40)  m/uL


 


Hgb  14.0   (11.4-16.0)  gm/dL


 


Hct  40.5   (34.0-46.0)  %


 


MCV  93.0   (80.0-100.0)  fL


 


MCH  32.1   (25.0-35.0)  pg


 


MCHC  34.5   (31.0-37.0)  g/dL


 


RDW  13.0   (11.5-15.5)  %


 


Plt Count  172   (150-450)  k/uL


 


MPV  7.2   


 


Neutrophils %  61   %


 


Lymphocytes %  30   %


 


Monocytes %  5   %


 


Eosinophils %  3   %


 


Basophils %  0   %


 


Neutrophils #  4.9   (1.3-7.7)  k/uL


 


Lymphocytes #  2.4   (1.0-4.8)  k/uL


 


Monocytes #  0.4   (0-1.0)  k/uL


 


Eosinophils #  0.3   (0-0.7)  k/uL


 


Basophils #  0.0   (0-0.2)  k/uL


 


Sodium   140  (137-145)  mmol/L


 


Potassium   4.0  (3.5-5.1)  mmol/L


 


Chloride   106  ()  mmol/L


 


Carbon Dioxide   24  (22-30)  mmol/L


 


Anion Gap   10  mmol/L


 


BUN   16  (7-17)  mg/dL


 


Creatinine   0.99  (0.52-1.04)  mg/dL


 


Est GFR (CKD-EPI)AfAm   69  (>60 ml/min/1.73 sqM)  


 


Est GFR (CKD-EPI)NonAf   60  (>60 ml/min/1.73 sqM)  


 


Glucose   93  (74-99)  mg/dL


 


Calcium   9.5  (8.4-10.2)  mg/dL


 


Magnesium   1.9  (1.6-2.3)  mg/dL


 


Total Bilirubin   0.7  (0.2-1.3)  mg/dL


 


AST   27  (14-36)  U/L


 


ALT   25  (4-34)  U/L


 


Alkaline Phosphatase   81  ()  U/L


 


Total Protein   7.1  (6.3-8.2)  g/dL


 


Albumin   4.4  (3.5-5.0)  g/dL


 


Lipase   77  ()  U/L














Disposition


Clinical Impression: 


 Abdominal pain





Disposition: HOME SELF-CARE


Condition: Stable


Instructions (If sedation given, give patient instructions):  Abdominal Pain 

(ED)


Is patient prescribed a controlled substance at d/c from ED?: No


Referrals: 


Harvinder Rosa MD [Primary Care Provider] - 1-2 days


Time of Disposition: 17:00

## 2025-06-16 ENCOUNTER — HOSPITAL ENCOUNTER (OUTPATIENT)
Dept: HOSPITAL 47 - EC | Age: 70
Setting detail: OBSERVATION
LOS: 1 days | Discharge: HOME | End: 2025-06-17
Attending: FAMILY MEDICINE | Admitting: FAMILY MEDICINE
Payer: MEDICARE

## 2025-06-16 VITALS — TEMPERATURE: 97.9 F

## 2025-06-16 DIAGNOSIS — Z79.02: ICD-10-CM

## 2025-06-16 DIAGNOSIS — R55: Primary | ICD-10-CM

## 2025-06-16 DIAGNOSIS — E78.5: ICD-10-CM

## 2025-06-16 DIAGNOSIS — F41.9: ICD-10-CM

## 2025-06-16 DIAGNOSIS — I10: ICD-10-CM

## 2025-06-16 DIAGNOSIS — Z91.041: ICD-10-CM

## 2025-06-16 DIAGNOSIS — Z79.82: ICD-10-CM

## 2025-06-16 DIAGNOSIS — Z91.048: ICD-10-CM

## 2025-06-16 DIAGNOSIS — I44.0: ICD-10-CM

## 2025-06-16 DIAGNOSIS — I08.3: ICD-10-CM

## 2025-06-16 DIAGNOSIS — F10.90: ICD-10-CM

## 2025-06-16 DIAGNOSIS — Z86.73: ICD-10-CM

## 2025-06-16 DIAGNOSIS — Z79.899: ICD-10-CM

## 2025-06-16 LAB
ALBUMIN SERPL-MCNC: 4.1 G/DL (ref 3.5–5)
ALP SERPL-CCNC: 76 U/L (ref 38–126)
ALT SERPL-CCNC: 21 U/L (ref 4–34)
ANION GAP SERPL CALC-SCNC: 14 MMOL/L
ANISOCYTOSIS BLD QL SMEAR: (no result)
APTT BLD: 23.7 SEC (ref 22–30)
AST SERPL-CCNC: 23 U/L (ref 14–36)
BASO STIPL BLD QL SMEAR: (no result)
BASOPHILS # BLD AUTO: 0.03 10*3/UL (ref 0–0.1)
BASOPHILS NFR BLD AUTO: 0.4 %
BILIRUB BLD-MCNC: 0.7 MG/DL (ref 0.2–1.3)
BUN SERPL-SCNC: 18 MG/DL (ref 7–17)
BURR CELLS BLD QL SMEAR: (no result)
CALCIUM SPEC-MCNC: 9.6 MG/DL (ref 8.4–10.2)
CHLORIDE SERPL-SCNC: 105 MMOL/L (ref 98–107)
CO2 SERPL-SCNC: 22 MMOL/L (ref 22–30)
CREATININE: 0.99 MG/DL (ref 0.52–1.04)
DACRYOCYTES BLD QL SMEAR: (no result)
DOHLE BOD BLD QL SMEAR: (no result)
EOSINOPHIL # BLD AUTO: 0.16 10*3/UL (ref 0.04–0.35)
EOSINOPHIL NFR BLD AUTO: 2 %
ERYTHROCYTE [DISTWIDTH] IN BLOOD BY AUTOMATED COUNT: 4.02 10*6/UL (ref 4.1–5.2)
ERYTHROCYTE [DISTWIDTH] IN BLOOD: 12.9 % (ref 11.5–14.5)
GLUCOSE SERPL-MCNC: 90 MG/DL (ref 74–99)
HCT VFR BLD AUTO: 36.7 % (ref 37.2–46.3)
HGB BLD-MCNC: 12.8 G/DL (ref 12–15)
HOWELL-JOLLY BOD BLD QL SMEAR: (no result)
HYPOCHROMIA BLD QL SMEAR: (no result)
IMM GRANULOCYTES # BLD: 0.02 10*3/UL (ref 0–0.04)
INR PPP: 1.1 (ref ?–1.2)
LDLC SERPL DIRECT ASSAY-MCNC: (no result) MG/DL
LG PLATELETS BLD QL SMEAR: (no result)
LYMPHOCYTES # SPEC AUTO: 2.21 10*3/UL (ref 0.9–5)
LYMPHOCYTES NFR SPEC AUTO: 28.3 %
Lab: (no result)
MAGNESIUM SPEC-SCNC: 1.8 MG/DL (ref 1.6–2.3)
MCH RBC QN AUTO: 31.8 PG (ref 27–32)
MCHC RBC AUTO-ENTMCNC: 34.9 G/DL (ref 32–37)
MCV RBC AUTO: 91.3 FL (ref 80–97)
MONOCYTES # BLD AUTO: 0.5 10*3/UL (ref 0.2–1)
MONOCYTES NFR BLD AUTO: 6.4 %
NEUTROPHILS # BLD AUTO: 4.9 10*3/UL (ref 1.8–7.7)
NEUTROPHILS NFR BLD AUTO: 62.6 %
NEUTS HYPERSEG # BLD: (no result) 10*3/UL
NRBC BLD AUTO-RTO: (no result) %
OVALOCYTES BLD QL SMEAR: (no result)
PELGER HUET CELLS BLD QL SMEAR: (no result)
PLATELET # BLD AUTO: 199 10*3/UL (ref 140–440)
PLATELETS.RETICULATED NFR BLD AUTO: (no result) %
PMV BLD AUTO: 8.8 FL (ref 9.5–12.2)
POIKILOCYTOSIS BLD QL SMEAR: (no result)
POIKILOCYTOSIS BLD QL SMEAR: (no result)
POLYCHROMASIA BLD QL SMEAR: (no result)
POTASSIUM SERPL-SCNC: 3.4 MMOL/L (ref 3.5–5.1)
PROT SERPL-MCNC: 6.5 G/DL (ref 6.3–8.2)
PT BLD: 11.6 SEC (ref 10–12.5)
RBC MORPH BLD: (no result)
ROULEAUX BLD QL SMEAR: (no result)
SCHISTOCYTES BLD QL SMEAR: (no result)
SICKLE CELLS BLD QL SMEAR: (no result)
SODIUM SERPL-SCNC: 141 MMOL/L (ref 137–145)
SPHEROCYTES BLD QL SMEAR: (no result)
STOMATOCYTES BLD QL SMEAR: (no result)
TARGETS BLD QL SMEAR: (no result)
TOXIC GRANULES BLD QL SMEAR: (no result)
VARIANT LYMPHS BLD QL SMEAR: (no result)
WBC # BLD AUTO: 7.82 10*3/UL (ref 4.5–10)
WBC NRBC COR # BLD: (no result) K/UL
WBC TOXIC VACUOLES BLD QL SMEAR: (no result)

## 2025-06-16 PROCEDURE — 80053 COMPREHEN METABOLIC PANEL: CPT

## 2025-06-16 PROCEDURE — 85379 FIBRIN DEGRADATION QUANT: CPT

## 2025-06-16 PROCEDURE — 93005 ELECTROCARDIOGRAM TRACING: CPT

## 2025-06-16 PROCEDURE — 85730 THROMBOPLASTIN TIME PARTIAL: CPT

## 2025-06-16 PROCEDURE — 96361 HYDRATE IV INFUSION ADD-ON: CPT

## 2025-06-16 PROCEDURE — 70450 CT HEAD/BRAIN W/O DYE: CPT

## 2025-06-16 PROCEDURE — 96375 TX/PRO/DX INJ NEW DRUG ADDON: CPT

## 2025-06-16 PROCEDURE — 83735 ASSAY OF MAGNESIUM: CPT

## 2025-06-16 PROCEDURE — 85610 PROTHROMBIN TIME: CPT

## 2025-06-16 PROCEDURE — 80061 LIPID PANEL: CPT

## 2025-06-16 PROCEDURE — 99285 EMERGENCY DEPT VISIT HI MDM: CPT

## 2025-06-16 PROCEDURE — 84484 ASSAY OF TROPONIN QUANT: CPT

## 2025-06-16 PROCEDURE — 85025 COMPLETE CBC W/AUTO DIFF WBC: CPT

## 2025-06-16 PROCEDURE — 71275 CT ANGIOGRAPHY CHEST: CPT

## 2025-06-16 PROCEDURE — 96374 THER/PROPH/DIAG INJ IV PUSH: CPT

## 2025-06-16 PROCEDURE — 36415 COLL VENOUS BLD VENIPUNCTURE: CPT

## 2025-06-16 RX ADMIN — FAMOTIDINE STA MG: 10 INJECTION, SOLUTION INTRAVENOUS at 19:52

## 2025-06-16 RX ADMIN — ASPIRIN SCH: 325 TABLET ORAL at 23:44

## 2025-06-16 RX ADMIN — CEFAZOLIN STA MLS/HR: 330 INJECTION, POWDER, FOR SOLUTION INTRAMUSCULAR; INTRAVENOUS at 18:55

## 2025-06-16 RX ADMIN — DIPHENHYDRAMINE HYDROCHLORIDE STA MG: 50 INJECTION, SOLUTION INTRAMUSCULAR; INTRAVENOUS at 19:52

## 2025-06-16 RX ADMIN — METHYLPREDNISOLONE SODIUM SUCCINATE STA MG: 125 INJECTION, POWDER, FOR SOLUTION INTRAMUSCULAR; INTRAVENOUS at 19:52

## 2025-06-17 VITALS — RESPIRATION RATE: 16 BRPM | DIASTOLIC BLOOD PRESSURE: 66 MMHG | SYSTOLIC BLOOD PRESSURE: 97 MMHG | HEART RATE: 75 BPM

## 2025-06-17 LAB
CHOLEST SERPL-MCNC: 164 MG/DL (ref 0–200)
CHOLEST/HDLC SERPL: 5.16 RATIO
HDLC SERPL-MCNC: 31.8 MG/DL (ref 40–60)
LDLC SERPL CALC-MCNC: 98.8 MG/DL (ref 0–131)
TRIGL SERPL-MCNC: 167 MG/DL (ref 0–149)
VLDLC SERPL CALC-MCNC: 33.4 MG/DL (ref 5–40)

## 2025-06-17 RX ADMIN — LOSARTAN POTASSIUM SCH MG: 50 TABLET, FILM COATED ORAL at 09:13

## 2025-06-17 RX ADMIN — HYDRALAZINE HYDROCHLORIDE SCH: 25 TABLET ORAL at 07:46

## 2025-06-17 RX ADMIN — CLOPIDOGREL BISULFATE SCH MG: 75 TABLET ORAL at 09:13

## 2025-06-17 RX ADMIN — CARVEDILOL SCH: 12.5 TABLET, FILM COATED ORAL at 07:46

## 2025-06-17 RX ADMIN — ASPIRIN 81 MG CHEWABLE TABLET SCH MG: 81 TABLET CHEWABLE at 09:13
